# Patient Record
Sex: FEMALE | Race: WHITE | NOT HISPANIC OR LATINO | Employment: FULL TIME | URBAN - METROPOLITAN AREA
[De-identification: names, ages, dates, MRNs, and addresses within clinical notes are randomized per-mention and may not be internally consistent; named-entity substitution may affect disease eponyms.]

---

## 2023-06-06 ENCOUNTER — OFFICE VISIT (OUTPATIENT)
Dept: PRIMARY CARE | Facility: CLINIC | Age: 58
End: 2023-06-06
Payer: COMMERCIAL

## 2023-06-06 VITALS
HEART RATE: 71 BPM | SYSTOLIC BLOOD PRESSURE: 107 MMHG | DIASTOLIC BLOOD PRESSURE: 68 MMHG | HEIGHT: 61 IN | BODY MASS INDEX: 25.3 KG/M2 | OXYGEN SATURATION: 96 % | WEIGHT: 134 LBS | TEMPERATURE: 97.3 F

## 2023-06-06 DIAGNOSIS — K52.9 CHRONIC DIARRHEA: ICD-10-CM

## 2023-06-06 DIAGNOSIS — R53.82 CHRONIC FATIGUE: ICD-10-CM

## 2023-06-06 DIAGNOSIS — E78.5 HYPERLIPIDEMIA, UNSPECIFIED HYPERLIPIDEMIA TYPE: ICD-10-CM

## 2023-06-06 DIAGNOSIS — E06.3 HASHIMOTO'S DISEASE: Primary | ICD-10-CM

## 2023-06-06 PROBLEM — S32.9XXA PELVIC FRACTURE (MULTI): Status: ACTIVE | Noted: 2023-06-06

## 2023-06-06 PROBLEM — Z85.43 HX OF OVARIAN CANCER: Status: ACTIVE | Noted: 2023-06-06

## 2023-06-06 PROBLEM — C50.919 ADENOCARCINOMA OF BREAST (MULTI): Status: ACTIVE | Noted: 2023-06-06

## 2023-06-06 PROBLEM — J45.909 ASTHMA (HHS-HCC): Status: ACTIVE | Noted: 2023-06-06

## 2023-06-06 PROBLEM — Z85.3 HISTORY OF LEFT BREAST CANCER: Status: ACTIVE | Noted: 2023-06-06

## 2023-06-06 PROBLEM — G93.32 CHRONIC FATIGUE SYNDROME: Status: ACTIVE | Noted: 2020-04-29

## 2023-06-06 PROBLEM — E03.8 HYPOTHYROIDISM DUE TO HASHIMOTO'S THYROIDITIS: Status: ACTIVE | Noted: 2019-08-07

## 2023-06-06 PROBLEM — I34.1 MVP (MITRAL VALVE PROLAPSE): Status: ACTIVE | Noted: 2023-06-06

## 2023-06-06 PROBLEM — Z90.13 HISTORY OF BILATERAL MASTECTOMY: Status: ACTIVE | Noted: 2023-06-06

## 2023-06-06 PROBLEM — D68.2: Status: ACTIVE | Noted: 2023-06-06

## 2023-06-06 PROBLEM — M85.80 OSTEOPENIA: Status: ACTIVE | Noted: 2023-06-06

## 2023-06-06 LAB
ALANINE AMINOTRANSFERASE (SGPT) (U/L) IN SER/PLAS: 8 U/L (ref 7–45)
ALBUMIN (G/DL) IN SER/PLAS: 4.3 G/DL (ref 3.4–5)
ALKALINE PHOSPHATASE (U/L) IN SER/PLAS: 63 U/L (ref 33–110)
ANION GAP IN SER/PLAS: 11 MMOL/L (ref 10–20)
ASPARTATE AMINOTRANSFERASE (SGOT) (U/L) IN SER/PLAS: 10 U/L (ref 9–39)
BASOPHILS (10*3/UL) IN BLOOD BY AUTOMATED COUNT: 0.07 X10E9/L (ref 0–0.1)
BASOPHILS/100 LEUKOCYTES IN BLOOD BY AUTOMATED COUNT: 1 % (ref 0–2)
BILIRUBIN TOTAL (MG/DL) IN SER/PLAS: 0.3 MG/DL (ref 0–1.2)
CALCIUM (MG/DL) IN SER/PLAS: 10.1 MG/DL (ref 8.6–10.6)
CARBON DIOXIDE, TOTAL (MMOL/L) IN SER/PLAS: 20 MMOL/L (ref 21–32)
CHLORIDE (MMOL/L) IN SER/PLAS: 109 MMOL/L (ref 98–107)
CHOLESTEROL (MG/DL) IN SER/PLAS: 442 MG/DL (ref 0–199)
CHOLESTEROL IN HDL (MG/DL) IN SER/PLAS: 51.5 MG/DL
CHOLESTEROL/HDL RATIO: 8.6
CREATININE (MG/DL) IN SER/PLAS: 0.93 MG/DL (ref 0.5–1.05)
EOSINOPHILS (10*3/UL) IN BLOOD BY AUTOMATED COUNT: 0.19 X10E9/L (ref 0–0.7)
EOSINOPHILS/100 LEUKOCYTES IN BLOOD BY AUTOMATED COUNT: 2.8 % (ref 0–6)
ERYTHROCYTE DISTRIBUTION WIDTH (RATIO) BY AUTOMATED COUNT: 13.2 % (ref 11.5–14.5)
ERYTHROCYTE MEAN CORPUSCULAR HEMOGLOBIN CONCENTRATION (G/DL) BY AUTOMATED: 33.2 G/DL (ref 32–36)
ERYTHROCYTE MEAN CORPUSCULAR VOLUME (FL) BY AUTOMATED COUNT: 100 FL (ref 80–100)
ERYTHROCYTES (10*6/UL) IN BLOOD BY AUTOMATED COUNT: 3.96 X10E12/L (ref 4–5.2)
GFR FEMALE: 72 ML/MIN/1.73M2
GLUCOSE (MG/DL) IN SER/PLAS: 91 MG/DL (ref 74–99)
HEMATOCRIT (%) IN BLOOD BY AUTOMATED COUNT: 39.5 % (ref 36–46)
HEMOGLOBIN (G/DL) IN BLOOD: 13.1 G/DL (ref 12–16)
IMMATURE GRANULOCYTES/100 LEUKOCYTES IN BLOOD BY AUTOMATED COUNT: 0.3 % (ref 0–0.9)
LDL: 334 MG/DL (ref 0–99)
LEUKOCYTES (10*3/UL) IN BLOOD BY AUTOMATED COUNT: 6.8 X10E9/L (ref 4.4–11.3)
LYMPHOCYTES (10*3/UL) IN BLOOD BY AUTOMATED COUNT: 2.58 X10E9/L (ref 1.2–4.8)
LYMPHOCYTES/100 LEUKOCYTES IN BLOOD BY AUTOMATED COUNT: 37.9 % (ref 13–44)
MONOCYTES (10*3/UL) IN BLOOD BY AUTOMATED COUNT: 0.35 X10E9/L (ref 0.1–1)
MONOCYTES/100 LEUKOCYTES IN BLOOD BY AUTOMATED COUNT: 5.1 % (ref 2–10)
NEUTROPHILS (10*3/UL) IN BLOOD BY AUTOMATED COUNT: 3.59 X10E9/L (ref 1.2–7.7)
NEUTROPHILS/100 LEUKOCYTES IN BLOOD BY AUTOMATED COUNT: 52.9 % (ref 40–80)
NON HDL CHOLESTEROL: 391 MG/DL
NRBC (PER 100 WBCS) BY AUTOMATED COUNT: 0 /100 WBC (ref 0–0)
PLATELETS (10*3/UL) IN BLOOD AUTOMATED COUNT: 300 X10E9/L (ref 150–450)
POTASSIUM (MMOL/L) IN SER/PLAS: 4.4 MMOL/L (ref 3.5–5.3)
PROTEIN TOTAL: 7.5 G/DL (ref 6.4–8.2)
SODIUM (MMOL/L) IN SER/PLAS: 136 MMOL/L (ref 136–145)
THYROTROPIN (MIU/L) IN SER/PLAS BY DETECTION LIMIT <= 0.05 MIU/L: 0.41 MIU/L (ref 0.44–3.98)
THYROXINE (T4) FREE (NG/DL) IN SER/PLAS: 1.18 NG/DL (ref 0.78–1.48)
TRIGLYCERIDE (MG/DL) IN SER/PLAS: 284 MG/DL (ref 0–149)
UREA NITROGEN (MG/DL) IN SER/PLAS: 17 MG/DL (ref 6–23)
VLDL: 57 MG/DL (ref 0–40)

## 2023-06-06 PROCEDURE — 84443 ASSAY THYROID STIM HORMONE: CPT

## 2023-06-06 PROCEDURE — 80061 LIPID PANEL: CPT

## 2023-06-06 PROCEDURE — 99205 OFFICE O/P NEW HI 60 MIN: CPT | Performed by: INTERNAL MEDICINE

## 2023-06-06 PROCEDURE — 4004F PT TOBACCO SCREEN RCVD TLK: CPT | Performed by: INTERNAL MEDICINE

## 2023-06-06 PROCEDURE — 80053 COMPREHEN METABOLIC PANEL: CPT

## 2023-06-06 PROCEDURE — 85025 COMPLETE CBC W/AUTO DIFF WBC: CPT

## 2023-06-06 PROCEDURE — 84439 ASSAY OF FREE THYROXINE: CPT

## 2023-06-06 RX ORDER — LEVOTHYROXINE SODIUM 100 UG/1
100 TABLET ORAL
COMMUNITY

## 2023-06-06 RX ORDER — ONDANSETRON 8 MG/1
8 TABLET, ORALLY DISINTEGRATING ORAL EVERY 8 HOURS PRN
COMMUNITY

## 2023-06-06 RX ORDER — BIMATOPROST 3 UG/ML
1 SOLUTION TOPICAL NIGHTLY
COMMUNITY
Start: 2022-08-01

## 2023-06-06 RX ORDER — ALBUTEROL SULFATE 90 UG/1
2 AEROSOL, METERED RESPIRATORY (INHALATION) EVERY 4 HOURS PRN
COMMUNITY
Start: 2023-05-27

## 2023-06-06 RX ORDER — TRAZODONE HYDROCHLORIDE 50 MG/1
50 TABLET ORAL NIGHTLY
COMMUNITY
Start: 2023-05-14 | End: 2023-06-06 | Stop reason: ALTCHOICE

## 2023-06-06 RX ORDER — FLUTICASONE PROPIONATE 50 MCG
2 SPRAY, SUSPENSION (ML) NASAL DAILY
COMMUNITY
Start: 2023-04-30 | End: 2024-05-12 | Stop reason: ALTCHOICE

## 2023-06-06 ASSESSMENT — ENCOUNTER SYMPTOMS
DIARRHEA: 1
FEVER: 0
CONSTIPATION: 0
NAUSEA: 0
SHORTNESS OF BREATH: 0
ABDOMINAL PAIN: 1
UNEXPECTED WEIGHT CHANGE: 0
HEMATURIA: 0
FATIGUE: 1
BLOOD IN STOOL: 0

## 2023-06-06 NOTE — PROGRESS NOTES
Subjective   Patient ID: Maricruz Cantu is a 57 y.o. female who presents for New Patient Visit.    HPI   Acute concerns:  Chronic Fatigue - sleeps often 8-9 hours on average but still very tired during the day. States she does not snore. Started Taking trazodone 50 mg daily in 2022 after she had issues sleeping after her dog . She now feels it knocks her out and hopes to stopit.  - no mood changes, no myalgias. Does not fall asleep during the day but feels like she wants to take nap    Gastrointestinal issues: Has been occurring for many but noted worsening in last year.  - Has loose stools to watery stools- yesterday had 15-18 BM. On average goes about 4-5 times per day. Had colonoscopy  for these symptoms but biopsy negative for H pylori, celiac neg. No colitis. 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 Never treated for CMV  but saw ID and CMV DNA neg .  *History of positive C diff. In  and is unsure if she completed abx treatment.  - Cramping abdominal pain, sweating  - Non-bloody stools.    Chronic issues:  Hx of Left Breast Cancer: BRCA-, HER2 neg.  - Had bilateral mastectomy surgery in  then breast reconstruction with implant prosthesis and now s/p implant removal. No chemotherapy. Started tamoxifen briefly. Recently saw Breast surgery oncologist Dr. Deluca.  Currently in remission.  Also had radical hysterectomy with lymphadenectomy  Also reports strong family hx of breast and ovarian cancer.    Ovarian cancer  - s/p radical hysterectomy in . Stage 1 ovarian cancer and no chemotherapy. Currently in remission.    Hx of Mitral Valve Prolase:  - Diagnosed at 20 y.o. Still takes antibiotics before surgery and dental procedures.    Hashimotos's hypothyroidism  - levothyroxine 100 mcg    Factor XI deficiency, bleeding disorder.   - not hx of blood clots. Diagnosed by hematologist. No treatment. But before surgery tells hx of bleeding disorder. Never needed transfusion.    Osteopenia: On  Bone density scan in . Reports a hx of pelvic fracture without any trauma or fall.  Not taking vitamin D or calcium currently    Takes fluticasone for allergies. Albuterol inhaler rarely as needed.    Hx of taking statin for hyperlipidemia. Not currently on anything.    In  Had axillary lymphadenopathy - US showed likely benign lymph nodes, likely reactive from infection.    Past Med Hx: breast cancer, ovarian cancer, Hashimoto's, recurrent CMV infections. Hyperlipidemia   Pelvic fracture    Past Surg hx: breast mastectomy, breast reconstruction with implant prosthesis, breast implant removal, radical hysterectomy, septoplasty. appendectomy    Family Hx:   Brother - Crohns disease  Mother - ischemic colitis, hyperlipidemia  Paternal grandfather - diabetes  Colon cancer - paternal grandmother  at 52.  Father - prostate cancer,hyperlipidemia  Breast cancer - mother, grandmother, sister, aunt. But tested BRCA negative.  Paternal Aunt - recently diagnosed with melanoma.    Medications: levothyroxine 100 mcg, flonase, trazodone 50 mg, zofran PRN, latisse eye drops  Allergies: multiple drug allergies - see chart    Activity: Walks dog.   Diet: Eats relatively healthy. No red meat. No fast food.  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  Smoking: Smoked for 10 years and smoked 1 packs. Quit in 2019. Sparingly uses tobacco cigarettes. No marijuana use.  Alcohol: on special occasions, rarely.  Sexual hx: monogamous relationship. Has little sex drive since hysterectomy.    Review of Systems   Constitutional:  Positive for fatigue. Negative for fever and unexpected weight change.   Respiratory:  Negative for shortness of breath.    Gastrointestinal:  Positive for abdominal pain and diarrhea. Negative for blood in stool, constipation and nausea.   Genitourinary:  Negative for hematuria.       Objective   There were no vitals taken for this visit.    Physical  Exam  Constitutional:       Appearance: Normal appearance.   HENT:      Head: Normocephalic and atraumatic.      Right Ear: Tympanic membrane and ear canal normal.      Left Ear: Tympanic membrane and ear canal normal.      Nose: Nose normal.      Mouth/Throat:      Mouth: Mucous membranes are moist.      Pharynx: Oropharynx is clear.   Eyes:      Extraocular Movements: Extraocular movements intact.      Conjunctiva/sclera: Conjunctivae normal.      Pupils: Pupils are equal, round, and reactive to light.   Cardiovascular:      Rate and Rhythm: Normal rate and regular rhythm.      Pulses: Normal pulses.      Heart sounds: Normal heart sounds.   Pulmonary:      Effort: Pulmonary effort is normal.      Breath sounds: Normal breath sounds.   Abdominal:      General: Abdomen is flat. Bowel sounds are normal. There is no distension.      Palpations: Abdomen is soft.      Tenderness: There is no abdominal tenderness.   Musculoskeletal:         General: Normal range of motion.      Cervical back: Normal range of motion and neck supple.   Skin:     General: Skin is warm and dry.   Neurological:      General: No focal deficit present.      Mental Status: She is alert and oriented to person, place, and time.   Psychiatric:         Mood and Affect: Mood normal.         Behavior: Behavior normal.         Assessment/Plan   Diagnoses and all orders for this visit:  Hashimoto's disease  -     Tsh With Reflex To Free T4 If Abnormal  Hyperlipidemia, unspecified hyperlipidemia type  -     Lipid panel  -     Comprehensive metabolic panel  Chronic fatigue  -     CBC and Auto Differential  Chronic diarrhea  -     Lactoferrin, Fecal, Quantitative; Future  -     Fecal Fat, Qualitative; Future  -     C. difficile, PCR; Future    Maricruz Pepe is a pleasant 57 y.o. female presenting to establish care. She has PMHx of breast and ovarian cancer now in remission, hyperlipidemia, hashimoto hypothyroidism, MVP, Factor XI deficiency, osteopenia and  chronic diarrhea.    Chronic diarrhea, abdominal pain  C/f irritable bowel syndrome with diarrhea vs. IBD vs. C diff. EGD Colonoscopy eval in 2018 largely negative for inflammatory process, neg EGD. Neg for Celiac.  Hx of Cdiff in 2021, unsure if adequately treated  [  ] Fecal fat and  lactoferrin stool study  [  ] C. diff PCR     Chronic Fatigue  - Discontinue trazodone due to unwanted side effects of drowsiness. Will wean to 1/2 pill in 2 weeks, then 1/4 for 2 weeks and then stop  - Sleep hygiene recommendations  - Evaluate thyroid function  - Follow up stool studies    Oncology:  Hx of breast cancer and ovarian now s/p mastectomy and radical hysterectomy. Never required chemo. In remission.  [  ] Overdue for follow up with breast oncologist who usually orders screening imaging. Recommended follow up - will reach out to breast cancer oncology team about establishing with new oncologist.    Osteopenia.   - Recommend vitamin D and calcium  - Recommend repeat DEXA scan a next visit.    Hyperlipidemia  Hx of elevated LDL, total cholesterol. Not checked in a few years. Tried statin in past and stopped it. Will likely need to start medication.  [  ] lipid panel    Hashimoto hypothyroidism:  - Continue levothyroxine 100 mcg daily  [  ] TSH reflex T4    Health maintenance  Metabolic screening: Check lipids and CMP today  -Last colonoscopy:  Last colonoscopy at [facility]  in 2018 - Told to follow up in 10 years.   -Also Had EGD - negative, neg. H pylori  - Mammogram: Unable to get mammogram so often has MRI or US.   Recommending yearly exams with oncology - overdue.   -Pap Smears: not indicated since hysterectomy/removal of cervix  - Recommended seeing dermatology for evaluation, given hx of melanoma in Aunt.  -Smoking history: Very rarely, past 10 pack year hx  -Counseled regarding diet and exercise  - DEXA scan: Bone density scan in 2014. Hx of pelvic fracture without any trauma or fall. Recommend repeat  scan.  -Immunizations: Tdap 2017, UTD on COVID. States she refuses annual Flu vaccinations and shingles vaccine.  -Follow-up as needed and for AWV.    Patient seen and staffed with Dr. Ayo Santizo MD  PGY1  Med Peds

## 2023-06-06 NOTE — PROGRESS NOTES
I saw and evaluated the patient. I personally obtained the key and critical portions of the history and physical exam or was physically present for key and critical portions performed by the resident/fellow. I reviewed the resident/fellow's documentation and discussed the patient with the resident/fellow. I agree with the resident/fellow's medical decision making as documented in the note.    Shine Rollins MD

## 2023-06-08 DIAGNOSIS — E78.5 HYPERLIPIDEMIA, UNSPECIFIED HYPERLIPIDEMIA TYPE: Primary | ICD-10-CM

## 2023-06-08 DIAGNOSIS — M85.80 OSTEOPENIA, UNSPECIFIED LOCATION: ICD-10-CM

## 2023-06-08 DIAGNOSIS — E78.2 HYPERLIPIDEMIA, MIXED: ICD-10-CM

## 2023-06-08 RX ORDER — ROSUVASTATIN CALCIUM 5 MG/1
5 TABLET, COATED ORAL DAILY
Qty: 30 TABLET | Refills: 5 | Status: SHIPPED | OUTPATIENT
Start: 2023-06-08 | End: 2023-12-05

## 2023-07-17 ENCOUNTER — PATIENT MESSAGE (OUTPATIENT)
Dept: PRIMARY CARE | Facility: CLINIC | Age: 58
End: 2023-07-17
Payer: COMMERCIAL

## 2023-07-17 DIAGNOSIS — M79.646 THUMB PAIN, UNSPECIFIED LATERALITY: Primary | ICD-10-CM

## 2023-10-27 ENCOUNTER — APPOINTMENT (OUTPATIENT)
Dept: PRIMARY CARE | Facility: CLINIC | Age: 58
End: 2023-10-27
Payer: COMMERCIAL

## 2023-11-28 ENCOUNTER — APPOINTMENT (OUTPATIENT)
Dept: PRIMARY CARE | Facility: CLINIC | Age: 58
End: 2023-11-28
Payer: COMMERCIAL

## 2023-12-19 ENCOUNTER — APPOINTMENT (OUTPATIENT)
Dept: PRIMARY CARE | Facility: CLINIC | Age: 58
End: 2023-12-19
Payer: COMMERCIAL

## 2024-01-08 ENCOUNTER — APPOINTMENT (OUTPATIENT)
Dept: PRIMARY CARE | Facility: CLINIC | Age: 59
End: 2024-01-08
Payer: COMMERCIAL

## 2024-05-12 ENCOUNTER — E-VISIT (OUTPATIENT)
Dept: PRIMARY CARE | Facility: CLINIC | Age: 59
End: 2024-05-12
Payer: COMMERCIAL

## 2024-05-12 ENCOUNTER — APPOINTMENT (OUTPATIENT)
Dept: PRIMARY CARE | Facility: CLINIC | Age: 59
End: 2024-05-12
Payer: COMMERCIAL

## 2024-05-12 DIAGNOSIS — J01.00 ACUTE NON-RECURRENT MAXILLARY SINUSITIS: Primary | ICD-10-CM

## 2024-05-12 PROCEDURE — 99421 OL DIG E/M SVC 5-10 MIN: CPT | Performed by: NURSE PRACTITIONER

## 2024-05-12 RX ORDER — AMOXICILLIN AND CLAVULANATE POTASSIUM 875; 125 MG/1; MG/1
1 TABLET, FILM COATED ORAL 2 TIMES DAILY
Qty: 20 TABLET | Refills: 0 | Status: SHIPPED | OUTPATIENT
Start: 2024-05-12 | End: 2024-05-22

## 2024-05-12 RX ORDER — FLUTICASONE PROPIONATE 50 MCG
1 SPRAY, SUSPENSION (ML) NASAL DAILY
Qty: 16 G | Refills: 0 | Status: SHIPPED | OUTPATIENT
Start: 2024-05-12

## 2024-05-12 ASSESSMENT — LIFESTYLE VARIABLES: HISTORY_OF_SMOKING: I SMOKED IN THE PAST, BUT QUIT

## 2024-05-12 NOTE — TELEPHONE ENCOUNTER
57 yo female with E-visit for c/o sinus pain/pressure.   Sx onset: 2-4 weeks  No relief with nasal saline and tylenol  No fevers  Allergies reviewed  Pt treated with Augmentin x 10 days  Encouraged to use nasal saline lavage

## 2024-06-28 ENCOUNTER — OFFICE VISIT (OUTPATIENT)
Dept: ORTHOPEDIC SURGERY | Facility: HOSPITAL | Age: 59
End: 2024-06-28
Payer: COMMERCIAL

## 2024-06-28 DIAGNOSIS — M65.311 TRIGGER FINGER OF RIGHT THUMB: Primary | ICD-10-CM

## 2024-06-28 DIAGNOSIS — M65.311 TRIGGER THUMB OF RIGHT HAND: ICD-10-CM

## 2024-06-28 PROCEDURE — 99214 OFFICE O/P EST MOD 30 MIN: CPT | Performed by: ORTHOPAEDIC SURGERY

## 2024-06-28 NOTE — H&P (VIEW-ONLY)
CHIEF COMPLAINT         Right thumb pain    ASSESSMENT + PLAN    Right trigger thumb    The nature of trigger digit was reviewed, along with the natural history.  I reviewed the options for management, including observation, nonsteroidals, splinting, oral steroids, cortisone injection, or surgical release, along with the likely success rates of each.  I reviewed the major risks of surgery, along with the benefits, and the postop rehabilitation course.    The patient is interested in proceeding with surgery and will contact the office to schedule a date.  Surgery will be done under sedation and local, at the location of her convenience.  A Stack splint was provided to provide some comfort in the meantime.        HISTORY OF PRESENT ILLNESS       Patient is a 58 y.o. left-hand dominant female  operations, who presents today for evaluation of pain, popping, and clicking of the right thumb over the last week or 2.  No particular trauma around time of onset.  This reminds her of her previous left trigger thumb that improved, but did not completely resolve with the previous splinting.  She wants to go ahead with trigger thumb surgical release on the right.  No numbness or tingling.  No other bothersome digits today.    She is not diabetic.  She is hypothyroid on levothyroxine.  She does not smoke.      REVIEW OF SYSTEMS       An updated 30-item multi-system Review Of Systems was obtained on today's intake form.  This was reviewed with the patient and is correct.  It has been scanned separately into the medical record.      PHYSICAL EXAM    Constitutional:    Appears stated age. Well-developed and well-nourished female in no acute distress.  Psychiatric:         Pleasant normal mood and affect. Behavior is appropriate for the situation.   Head:                   Normocephalic and atraumatic.  Eyes:                    Pupils are equal and round.  Cardiovascular:  2+ radial and ulnar pulses. Fingers  well-perfused.  Respiratory:        Effort normal. No respiratory distress. Speaking in complete sentences.  Neurologic:       Alert and oriented to person, place, and time.  Skin:                Skin is intact, warm and dry.  Hematologic / Lymphatic:    No lymphedema or lymphangitis.    Extremities / Musculoskeletal:                      Skin of the right thumb and hand is intact with no erythema, ecchymosis, or diffuse swelling.  Normal skin drag and coloration.  Full composite finger flexion extension with full intrinsic plus minus posture.  Obvious spontaneous triggering of the thumb.  Tender at the dorsal IP and the volar MP.  No de Quervain's or CMC signs.  Symmetric wrist and forearm motion.  Negative Paul.  Negative midcarpal shift.  Sensation intact to light touch in all distributions.  Capillary refill less than 2 seconds.      IMAGING / LABS / EMGs           X-[facility] were taken at [facility] yesterday.  The report was available in Epic, and shows arthritic change at the MP and IP joints.  The images were not available for my review.      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    Factor XI deficiency    Personal history of malignant neoplasm of breast 05/27/2021    History of malignant neoplasm of breast    Personal history of other diseases of the nervous system and sense organs     History of optic neuritis    Personal history of other diseases of the respiratory system 10/08/2013    History of bronchitis    Personal history of other diseases of the respiratory system 05/27/2021    Personal history of sinusitis    Personal history of other infectious and parasitic diseases 04/18/2014    History of herpes zoster       Medication Documentation Review Audit       Reviewed by ZEESHAN Prado (Nurse Practitioner) on 05/12/24 at 1628      Medication Order Taking? Sig Documenting Provider Last Dose Status   albuterol 90 mcg/actuation inhaler [id number]   Inhale 2 puffs every 4 hours if needed for wheezing or shortness of breath. Historical Provider, MD  Active   amoxicillin-pot clavulanate (Augmentin) 875-125 mg tablet [id number]  Take 1 tablet by mouth 2 times a day for 10 days. AMRIK Prado-CNP  Active   bimatoprost (Latisse) 0.03 % ophthalmic solution [id number]  Administer 1 Application into both eyes once daily at bedtime. Historical Provider, MD  Active   fluticasone (Flonase) 50 mcg/actuation nasal spray [id number]  Administer 2 sprays into each nostril once daily. Historical Provider, MD  Active   levothyroxine (Synthroid, Levoxyl) 100 mcg tablet [id number]  Take 1 tablet (100 mcg) by mouth once daily in the morning. Take before meals. Historical Provider, MD  Active   ondansetron ODT (Zofran-ODT) 8 mg disintegrating tablet [id number]  Take 1 tablet (8 mg) by mouth every 8 hours if needed for nausea or vomiting. Historical Provider, MD  Active   rosuvastatin (Crestor) 5 mg tablet [id number]  Take 1 tablet (5 mg) by mouth once daily. Tatyana Santizo MD   23 [id number]                    Allergies   Allergen Reactions    Nitrofurantoin Monohyd/M-Cryst Other    Oxycodone-Acetaminophen Other    Prochlorperazine Other    Sulfa (Sulfonamide Antibiotics) Other    Sulfamethoxazole Other       Social History     Socioeconomic History    Marital status:      Spouse name: Not on file    Number of children: Not on file    Years of education: Not on file    Highest education level: Not on file   Occupational History    Not on file   Tobacco Use    Smoking status: Some Days     Current packs/day: 1.00     Average packs/day: 1 pack/day for 10.0 years (10.0 ttl pk-yrs)     Types: Cigarettes    Smokeless tobacco: Never    Tobacco comments:     States she quit smoking regularly in 2019. Now only rarely smokes cigarettes. States less than once per week.   Substance and Sexual Activity    Alcohol use: Not Currently    Drug use: Never    Sexual activity: Not  Currently   Other Topics Concern    Not on file   Social History Narrative    Not on file     Social Determinants of Health     Financial Resource Strain: Low Risk  (1/17/2023)    Received from [facility]    Overall Financial Resource Strain (CARDIA)     Difficulty of Paying Living Expenses: Not very hard   Food Insecurity: No Food Insecurity (1/17/2023)    Received from [facility]    Hunger Vital Sign     Worried About Running Out of Food in the Last Year: Never true     Ran Out of Food in the Last Year: Never true   Transportation Needs: No Transportation Needs (1/17/2023)    Received from [facility]    PRAPARE - Transportation     Lack of Transportation (Medical): No     Lack of Transportation (Non-Medical): No   Physical Activity: Insufficiently Active (1/17/2023)    Received from [facility]    Exercise Vital Sign     Days of Exercise per Week: 3 days     Minutes of Exercise per Session: 30 min   Stress: Stress Concern Present (1/17/2023)    Received from [facility]    [employer] [place] [employer] - Occupational Stress Questionnaire     Feeling of Stress : To some extent   Social Connections: Moderately Integrated (1/17/2023)    Received from [facility]    Social Connection and Isolation Panel [NHANES]     Frequency of Communication with Friends and Family: More than three times a week     Frequency of Social Gatherings with Friends and Family: Not on file     Attends Jain Services: More than 4 times per year     Active Member of Clubs or Organizations: No     Attends Club or Organization Meetings: Not on file     Marital Status:    Intimate Partner Violence: Not on file   Housing Stability: High Risk (1/17/2023)    Received from [facility]    Housing Stability Vital Sign     Unable to Pay for Housing in the Last Year: Yes     Number of Places Lived in the Last Year: 1     Unstable Housing in the Last Year: No       Past Surgical History:    Procedure Laterality Date    MASTECTOMY  05/27/2021    Breast Surgery Mastectomy    OTHER SURGICAL HISTORY  05/27/2021    Breast Reconstruction With Implant Prosthesis    OTHER SURGICAL HISTORY  05/27/2021    Radical Hysterectomy With Lymphadenectomy    SEPTOPLASTY  05/27/2021    Septoplasty     SURGICAL INDICATION    I reviewed the options for further management of this condition and the likely success rates of each.  The patient feels that they have maximized the benefits of conservative care, and they do want to go on to surgery.    I reviewed the major risks of surgery including infection; scarring; damage to nerves, tendons, or vessels; stiffness; failure to relieve symptoms, recurrent symptoms, progressive arthritis, and wound healing problems, as well as anesthesia risks.  I answered their questions to their satisfaction.  They were given my contact information and will contact the office when they are ready to schedule an exact surgical date.  Surgery will be posted as follows :    Dx :          Right trigger thumb  ICD-10 :      M65.311  Procedure :     Right trigger thumb release  CPT :        88117  Anesth :    MAC  Location :   [facility]  Duration :    45 minutes  Specials :    None  PAT :       No  Post-Op Visit :    10-15 days      Electronically Signed      MICHELLE Vasquez MD      Orthopaedic Hand Surgery      [phone]

## 2024-06-28 NOTE — LETTER
June 28, 2024     Shine Rollins MD  [address] [facility] [address]  [facility] [address]    Patient: Maricruz Cantu   YOB: 1965   Date of Visit: 6/28/2024       Dear Dr. Shine Rollins MD:    Thank you for referring Maricruz Cantu to me for evaluation. Below are my notes for this consultation.  If you have questions, please do not hesitate to call me. I look forward to following your patient along with you.       Sincerely,     Gerson Vasquez MD      CC: No Recipients  ______________________________________________________________________________________    CHIEF COMPLAINT         Right thumb pain    ASSESSMENT + PLAN    Right trigger thumb    The nature of trigger digit was reviewed, along with the natural history.  I reviewed the options for management, including observation, nonsteroidals, splinting, oral steroids, cortisone injection, or surgical release, along with the likely success rates of each.  I reviewed the major risks of surgery, along with the benefits, and the postop rehabilitation course.    The patient is interested in proceeding with surgery and will contact the office to schedule a date.  Surgery will be done under sedation and local, at the location of her convenience.  A Stack splint was provided to provide some comfort in the meantime.        HISTORY OF PRESENT ILLNESS       Patient is a 58 y.o. left-hand dominant female  operations, who presents today for evaluation of pain, popping, and clicking of the right thumb over the last week or 2.  No particular trauma around time of onset.  This reminds her of her previous left trigger thumb that improved, but did not completely resolve with the previous splinting.  She wants to go ahead with trigger thumb surgical release on the right.  No numbness or tingling.  No other bothersome digits today.    She is not diabetic.  She is hypothyroid on levothyroxine.  She does not smoke.      REVIEW OF SYSTEMS       An updated 30-item  multi-system Review Of Systems was obtained on today's intake form.  This was reviewed with the patient and is correct.  It has been scanned separately into the medical record.      PHYSICAL EXAM    Constitutional:    Appears stated age. Well-developed and well-nourished female in no acute distress.  Psychiatric:         Pleasant normal mood and affect. Behavior is appropriate for the situation.   Head:                   Normocephalic and atraumatic.  Eyes:                    Pupils are equal and round.  Cardiovascular:  2+ radial and ulnar pulses. Fingers well-perfused.  Respiratory:        Effort normal. No respiratory distress. Speaking in complete sentences.  Neurologic:       Alert and oriented to person, place, and time.  Skin:                Skin is intact, warm and dry.  Hematologic / Lymphatic:    No lymphedema or lymphangitis.    Extremities / Musculoskeletal:                      Skin of the right thumb and hand is intact with no erythema, ecchymosis, or diffuse swelling.  Normal skin drag and coloration.  Full composite finger flexion extension with full intrinsic plus minus posture.  Obvious spontaneous triggering of the thumb.  Tender at the dorsal IP and the volar MP.  No de Quervain's or CMC signs.  Symmetric wrist and forearm motion.  Negative Paul.  Negative midcarpal shift.  Sensation intact to light touch in all distributions.  Capillary refill less than 2 seconds.      IMAGING / LABS / EMGs           X-[facility] were taken at [facility] yesterday.  The report was available in Epic, and shows arthritic change at the MP and IP joints.  The images were not available for my review.      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    Factor XI deficiency   • Personal history of malignant neoplasm of breast 05/27/2021    History of malignant neoplasm of breast   • Personal history of other diseases of the nervous system and sense organs     History of optic  neuritis   • Personal history of other diseases of the respiratory system 10/08/2013    History of bronchitis   • Personal history of other diseases of the respiratory system 2021    Personal history of sinusitis   • Personal history of other infectious and parasitic diseases 2014    History of herpes zoster       Medication Documentation Review Audit       Reviewed by ZEESHAN Prado (Nurse Practitioner) on 24 at 1628      Medication Order Taking? Sig Documenting Provider Last Dose Status   albuterol 90 mcg/actuation inhaler [id number]  Inhale 2 puffs every 4 hours if needed for wheezing or shortness of breath. Historical Provider, MD  Active   amoxicillin-pot clavulanate (Augmentin) 875-125 mg tablet [id number]  Take 1 tablet by mouth 2 times a day for 10 days. ZEESHAN Prado  Active   bimatoprost (Latisse) 0.03 % ophthalmic solution [id number]  Administer 1 Application into both eyes once daily at bedtime. Historical Provider, MD  Active   fluticasone (Flonase) 50 mcg/actuation nasal spray [id number]  Administer 2 sprays into each nostril once daily. Historical Provider, MD  Active   levothyroxine (Synthroid, Levoxyl) 100 mcg tablet [id number]  Take 1 tablet (100 mcg) by mouth once daily in the morning. Take before meals. Historical Provider, MD  Active   ondansetron ODT (Zofran-ODT) 8 mg disintegrating tablet [id number]  Take 1 tablet (8 mg) by mouth every 8 hours if needed for nausea or vomiting. Historical Provider, MD  Active   rosuvastatin (Crestor) 5 mg tablet [id number]  Take 1 tablet (5 mg) by mouth once daily. Tatyana Santizo MD   23 [id number]                    Allergies   Allergen Reactions   • Nitrofurantoin Monohyd/M-Cryst Other   • Oxycodone-Acetaminophen Other   • Prochlorperazine Other   • Sulfa (Sulfonamide Antibiotics) Other   • Sulfamethoxazole Other       Social History     Socioeconomic History   • Marital status:      Spouse name: Not on file   •  Number of children: Not on file   • Years of education: Not on file   • Highest education level: Not on file   Occupational History   • Not on file   Tobacco Use   • Smoking status: Some Days     Current packs/day: 1.00     Average packs/day: 1 pack/day for 10.0 years (10.0 ttl pk-yrs)     Types: Cigarettes   • Smokeless tobacco: Never   • Tobacco comments:     States she quit smoking regularly in 2019. Now only rarely smokes cigarettes. States less than once per week.   Substance and Sexual Activity   • Alcohol use: Not Currently   • Drug use: Never   • Sexual activity: Not Currently   Other Topics Concern   • Not on file   Social History Narrative   • Not on file     Social Determinants of Health     Financial Resource Strain: Low Risk  (1/17/2023)    Received from [facility]    Overall Financial Resource Strain (CARDIA)    • Difficulty of Paying Living Expenses: Not very hard   Food Insecurity: No Food Insecurity (1/17/2023)    Received from [facility]    Hunger Vital Sign    • Worried About Running Out of Food in the Last Year: Never true    • Ran Out of Food in the Last Year: Never true   Transportation Needs: No Transportation Needs (1/17/2023)    Received from [facility]    PRAPARE - Transportation    • Lack of Transportation (Medical): No    • Lack of Transportation (Non-Medical): No   Physical Activity: Insufficiently Active (1/17/2023)    Received from [facility]    Exercise Vital Sign    • Days of Exercise per Week: 3 days    • Minutes of Exercise per Session: 30 min   Stress: Stress Concern Present (1/17/2023)    Received from [facility]    [employer] - Occupational Stress Questionnaire    • Feeling of Stress : To some extent   Social Connections: Moderately Integrated (1/17/2023)    Received from [facility]    Social Connection and Isolation Panel [NHANES]    • Frequency of Communication with Friends and Family: More than three times a  week    • Frequency of Social Gatherings with Friends and Family: Not on file    • Attends Scientologist Services: More than 4 times per year    • Active Member of Clubs or Organizations: No    • Attends Club or Organization Meetings: Not on file    • Marital Status:    Intimate Partner Violence: Not on file   Housing Stability: High Risk (1/17/2023)    Received from [facility]    Housing Stability Vital Sign    • Unable to Pay for Housing in the Last Year: Yes    • Number of Places Lived in the Last Year: 1    • Unstable Housing in the Last Year: No       Past Surgical History:   Procedure Laterality Date   • MASTECTOMY  05/27/2021    Breast Surgery Mastectomy   • OTHER SURGICAL HISTORY  05/27/2021    Breast Reconstruction With Implant Prosthesis   • OTHER SURGICAL HISTORY  05/27/2021    Radical Hysterectomy With Lymphadenectomy   • SEPTOPLASTY  05/27/2021    Septoplasty     SURGICAL INDICATION    I reviewed the options for further management of this condition and the likely success rates of each.  The patient feels that they have maximized the benefits of conservative care, and they do want to go on to surgery.    I reviewed the major risks of surgery including infection; scarring; damage to nerves, tendons, or vessels; stiffness; failure to relieve symptoms, recurrent symptoms, progressive arthritis, and wound healing problems, as well as anesthesia risks.  I answered their questions to their satisfaction.  They were given my contact information and will contact the office when they are ready to schedule an exact surgical date.  Surgery will be posted as follows :    Dx :          Right trigger thumb  ICD-10 :      M65.311  Procedure :     Right trigger thumb release  CPT :        56427  Anesth :    MAC  [facility]  Duration :    45 minutes  Specials :    None  PAT :       No  Post-Op Visit :    10-15 days      Electronically Signed      MICHELLE Vasquez MD      Orthopaedic Hand  Surgery      [phone]

## 2024-06-28 NOTE — PROGRESS NOTES
CHIEF COMPLAINT         Right thumb pain    ASSESSMENT + PLAN    Right trigger thumb    The nature of trigger digit was reviewed, along with the natural history.  I reviewed the options for management, including observation, nonsteroidals, splinting, oral steroids, cortisone injection, or surgical release, along with the likely success rates of each.  I reviewed the major risks of surgery, along with the benefits, and the postop rehabilitation course.    The patient is interested in proceeding with surgery and will contact the office to schedule a date.  Surgery will be done under sedation and local, at the location of her convenience.  A Stack splint was provided to provide some comfort in the meantime.        HISTORY OF PRESENT ILLNESS       Patient is a 58 y.o. left-hand dominant female  operations, who presents today for evaluation of pain, popping, and clicking of the right thumb over the last week or 2.  No particular trauma around time of onset.  This reminds her of her previous left trigger thumb that improved, but did not completely resolve with the previous splinting.  She wants to go ahead with trigger thumb surgical release on the right.  No numbness or tingling.  No other bothersome digits today.    She is not diabetic.  She is hypothyroid on levothyroxine.  She does not smoke.      REVIEW OF SYSTEMS       An updated 30-item multi-system Review Of Systems was obtained on today's intake form.  This was reviewed with the patient and is correct.  It has been scanned separately into the medical record.      PHYSICAL EXAM    Constitutional:    Appears stated age. Well-developed and well-nourished female in no acute distress.  Psychiatric:         Pleasant normal mood and affect. Behavior is appropriate for the situation.   Head:                   Normocephalic and atraumatic.  Eyes:                    Pupils are equal and round.  Cardiovascular:  2+ radial and ulnar pulses. Fingers  well-perfused.  Respiratory:        Effort normal. No respiratory distress. Speaking in complete sentences.  Neurologic:       Alert and oriented to person, place, and time.  Skin:                Skin is intact, warm and dry.  Hematologic / Lymphatic:    No lymphedema or lymphangitis.    Extremities / Musculoskeletal:                      Skin of the right thumb and hand is intact with no erythema, ecchymosis, or diffuse swelling.  Normal skin drag and coloration.  Full composite finger flexion extension with full intrinsic plus minus posture.  Obvious spontaneous triggering of the thumb.  Tender at the dorsal IP and the volar MP.  No de Quervain's or CMC signs.  Symmetric wrist and forearm motion.  Negative Paul.  Negative midcarpal shift.  Sensation intact to light touch in all distributions.  Capillary refill less than 2 seconds.      IMAGING / LABS / EMGs           X-rays were taken at [facility] yesterday.  The report was available in Epic, and shows arthritic change at the MP and IP joints.  The images were not available for my review.      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    Factor XI deficiency    Personal history of malignant neoplasm of breast 05/27/2021    History of malignant neoplasm of breast    Personal history of other diseases of the nervous system and sense organs     History of optic neuritis    Personal history of other diseases of the respiratory system 10/08/2013    History of bronchitis    Personal history of other diseases of the respiratory system 05/27/2021    Personal history of sinusitis    Personal history of other infectious and parasitic diseases 04/18/2014    History of herpes zoster       Medication Documentation Review Audit       Reviewed by ZEESHAN Prado (Nurse Practitioner) on 05/12/24 at 1628      Medication Order Taking? Sig Documenting Provider Last Dose Status   albuterol 90 mcg/actuation inhaler [id number]   Inhale 2 puffs every 4 hours if needed for wheezing or shortness of breath. Historical Provider, MD  Active   amoxicillin-pot clavulanate (Augmentin) 875-125 mg tablet [id number]  Take 1 tablet by mouth 2 times a day for 10 days. AMRIK Prado-CNP  Active   bimatoprost (Latisse) 0.03 % ophthalmic solution [id number]  Administer 1 Application into both eyes once daily at bedtime. Historical Provider, MD  Active   fluticasone (Flonase) 50 mcg/actuation nasal spray [id number]  Administer 2 sprays into each nostril once daily. Historical Provider, MD  Active   levothyroxine (Synthroid, Levoxyl) 100 mcg tablet [id number]  Take 1 tablet (100 mcg) by mouth once daily in the morning. Take before meals. Historical Provider, MD  Active   ondansetron ODT (Zofran-ODT) 8 mg disintegrating tablet [id number]  Take 1 tablet (8 mg) by mouth every 8 hours if needed for nausea or vomiting. Historical Provider, MD  Active   rosuvastatin (Crestor) 5 mg tablet [id number]  Take 1 tablet (5 mg) by mouth once daily. Tatyana Santizo MD   23 2359                    Allergies   Allergen Reactions    Nitrofurantoin Monohyd/M-Cryst Other    Oxycodone-Acetaminophen Other    Prochlorperazine Other    Sulfa (Sulfonamide Antibiotics) Other    Sulfamethoxazole Other       Social History     Socioeconomic History    Marital status:      Spouse name: Not on file    Number of children: Not on file    Years of education: Not on file    Highest education level: Not on file   Occupational History    Not on file   Tobacco Use    Smoking status: Some Days     Current packs/day: 1.00     Average packs/day: 1 pack/day for 10.0 years (10.0 ttl pk-yrs)     Types: Cigarettes    Smokeless tobacco: Never    Tobacco comments:     States she quit smoking regularly in 2019. Now only rarely smokes cigarettes. States less than once per week.   Substance and Sexual Activity    Alcohol use: Not Currently    Drug use: Never    Sexual activity: Not  Currently   Other Topics Concern    Not on file   Social History Narrative    Not on file     Social Determinants of Health     Financial Resource Strain: Low Risk  (1/17/2023)    Received from [facility]    Overall Financial Resource Strain (CARDIA)     Difficulty of Paying Living Expenses: Not very hard   Food Insecurity: No Food Insecurity (1/17/2023)    Received from [facility]    Hunger Vital Sign     Worried About Running Out of Food in the Last Year: Never true     Ran Out of Food in the Last Year: Never true   Transportation Needs: No Transportation Needs (1/17/2023)    Received from [facility]    PRAPARE - Transportation     Lack of Transportation (Medical): No     Lack of Transportation (Non-Medical): No   Physical Activity: Insufficiently Active (1/17/2023)    Received from [facility]    Exercise Vital Sign     Days of Exercise per Week: 3 days     Minutes of Exercise per Session: 30 min   Stress: Stress Concern Present (1/17/2023)    Received from [facility]    [employer] [place] - Occupational Stress Questionnaire     Feeling of Stress : To some extent   Social Connections: Moderately Integrated (1/17/2023)    Received from [facility]    Social Connection and Isolation Panel [NHANES]     Frequency of Communication with Friends and Family: More than three times a week     Frequency of Social Gatherings with Friends and Family: Not on file     Attends Hindu Services: More than 4 times per year     Active Member of Clubs or Organizations: No     Attends Club or Organization Meetings: Not on file     Marital Status:    Intimate Partner Violence: Not on file   Housing Stability: High Risk (1/17/2023)    Received from [facility]    Housing Stability Vital Sign     Unable to Pay for Housing in the Last Year: Yes     Number of Places Lived in the Last Year: 1     Unstable Housing in the Last Year: No       Past Surgical History:    Procedure Laterality Date    MASTECTOMY  05/27/2021    Breast Surgery Mastectomy    OTHER SURGICAL HISTORY  05/27/2021    Breast Reconstruction With Implant Prosthesis    OTHER SURGICAL HISTORY  05/27/2021    Radical Hysterectomy With Lymphadenectomy    SEPTOPLASTY  05/27/2021    Septoplasty     SURGICAL INDICATION    I reviewed the options for further management of this condition and the likely success rates of each.  The patient feels that they have maximized the benefits of conservative care, and they do want to go on to surgery.    I reviewed the major risks of surgery including infection; scarring; damage to nerves, tendons, or vessels; stiffness; failure to relieve symptoms, recurrent symptoms, progressive arthritis, and wound healing problems, as well as anesthesia risks.  I answered their questions to their satisfaction.  They were given my contact information and will contact the office when they are ready to schedule an exact surgical date.  Surgery will be posted as follows :    Dx :          Right trigger thumb  ICD-10 :      M65.311  Procedure :     Right trigger thumb release  CPT :        88409  Anesth :    MAC  [facility] :   [facility]  Duration :    45 minutes  Specials :    None  PAT :       No  Post-Op Visit :    10-15 days      Electronically Signed      MICHELLE Vasquez MD      Orthopaedic Hand Surgery      [phone]

## 2024-07-02 ENCOUNTER — APPOINTMENT (OUTPATIENT)
Dept: ORTHOPEDIC SURGERY | Facility: HOSPITAL | Age: 59
End: 2024-07-02
Payer: COMMERCIAL

## 2024-07-09 ENCOUNTER — APPOINTMENT (OUTPATIENT)
Dept: ORTHOPEDIC SURGERY | Facility: HOSPITAL | Age: 59
End: 2024-07-09
Payer: COMMERCIAL

## 2024-07-11 ENCOUNTER — ANESTHESIA EVENT (OUTPATIENT)
Dept: OPERATING ROOM | Facility: CLINIC | Age: 59
End: 2024-07-11
Payer: COMMERCIAL

## 2024-07-11 ENCOUNTER — ANESTHESIA (OUTPATIENT)
Dept: OPERATING ROOM | Facility: CLINIC | Age: 59
End: 2024-07-11
Payer: COMMERCIAL

## 2024-07-11 ENCOUNTER — HOSPITAL ENCOUNTER (OUTPATIENT)
Facility: CLINIC | Age: 59
Setting detail: OUTPATIENT SURGERY
Discharge: HOME | End: 2024-07-11
Attending: ORTHOPAEDIC SURGERY | Admitting: ORTHOPAEDIC SURGERY
Payer: COMMERCIAL

## 2024-07-11 VITALS
TEMPERATURE: 98.4 F | HEIGHT: 61 IN | DIASTOLIC BLOOD PRESSURE: 60 MMHG | WEIGHT: 140.43 LBS | SYSTOLIC BLOOD PRESSURE: 111 MMHG | HEART RATE: 68 BPM | RESPIRATION RATE: 18 BRPM | BODY MASS INDEX: 26.51 KG/M2 | OXYGEN SATURATION: 97 %

## 2024-07-11 DIAGNOSIS — M65.311 TRIGGER THUMB OF RIGHT HAND: Primary | ICD-10-CM

## 2024-07-11 PROCEDURE — 2500000004 HC RX 250 GENERAL PHARMACY W/ HCPCS (ALT 636 FOR OP/ED): Performed by: ANESTHESIOLOGIST ASSISTANT

## 2024-07-11 PROCEDURE — 2500000004 HC RX 250 GENERAL PHARMACY W/ HCPCS (ALT 636 FOR OP/ED): Performed by: ORTHOPAEDIC SURGERY

## 2024-07-11 PROCEDURE — 7100000009 HC PHASE TWO TIME - INITIAL BASE CHARGE: Performed by: ORTHOPAEDIC SURGERY

## 2024-07-11 PROCEDURE — 26055 INCISE FINGER TENDON SHEATH: CPT | Performed by: ORTHOPAEDIC SURGERY

## 2024-07-11 PROCEDURE — 3600000008 HC OR TIME - EACH INCREMENTAL 1 MINUTE - PROCEDURE LEVEL THREE: Performed by: ORTHOPAEDIC SURGERY

## 2024-07-11 PROCEDURE — 3600000003 HC OR TIME - INITIAL BASE CHARGE - PROCEDURE LEVEL THREE: Performed by: ORTHOPAEDIC SURGERY

## 2024-07-11 PROCEDURE — 3700000002 HC GENERAL ANESTHESIA TIME - EACH INCREMENTAL 1 MINUTE: Performed by: ORTHOPAEDIC SURGERY

## 2024-07-11 PROCEDURE — A26055 PR INCISE FINGER TENDON SHEATH: Performed by: ANESTHESIOLOGY

## 2024-07-11 PROCEDURE — 2500000005 HC RX 250 GENERAL PHARMACY W/O HCPCS: Performed by: ORTHOPAEDIC SURGERY

## 2024-07-11 PROCEDURE — 3700000001 HC GENERAL ANESTHESIA TIME - INITIAL BASE CHARGE: Performed by: ORTHOPAEDIC SURGERY

## 2024-07-11 PROCEDURE — 7100000010 HC PHASE TWO TIME - EACH INCREMENTAL 1 MINUTE: Performed by: ORTHOPAEDIC SURGERY

## 2024-07-11 PROCEDURE — A26055 PR INCISE FINGER TENDON SHEATH: Performed by: ANESTHESIOLOGIST ASSISTANT

## 2024-07-11 RX ORDER — ONDANSETRON HYDROCHLORIDE 2 MG/ML
INJECTION, SOLUTION INTRAVENOUS AS NEEDED
Status: DISCONTINUED | OUTPATIENT
Start: 2024-07-11 | End: 2024-07-11

## 2024-07-11 RX ORDER — ONDANSETRON HYDROCHLORIDE 2 MG/ML
4 INJECTION, SOLUTION INTRAVENOUS ONCE AS NEEDED
Status: DISCONTINUED | OUTPATIENT
Start: 2024-07-11 | End: 2024-07-11 | Stop reason: HOSPADM

## 2024-07-11 RX ORDER — CEFAZOLIN 1 G/1
INJECTION, POWDER, FOR SOLUTION INTRAVENOUS AS NEEDED
Status: DISCONTINUED | OUTPATIENT
Start: 2024-07-11 | End: 2024-07-11

## 2024-07-11 RX ORDER — SODIUM CHLORIDE, SODIUM LACTATE, POTASSIUM CHLORIDE, CALCIUM CHLORIDE 600; 310; 30; 20 MG/100ML; MG/100ML; MG/100ML; MG/100ML
100 INJECTION, SOLUTION INTRAVENOUS CONTINUOUS
Status: DISCONTINUED | OUTPATIENT
Start: 2024-07-11 | End: 2024-07-11 | Stop reason: HOSPADM

## 2024-07-11 RX ORDER — SODIUM CHLORIDE 0.9 G/100ML
IRRIGANT IRRIGATION AS NEEDED
Status: DISCONTINUED | OUTPATIENT
Start: 2024-07-11 | End: 2024-07-11 | Stop reason: HOSPADM

## 2024-07-11 RX ORDER — HYDROCODONE BITARTRATE AND ACETAMINOPHEN 5; 325 MG/1; MG/1
1 TABLET ORAL EVERY 6 HOURS PRN
Qty: 14 TABLET | Refills: 0 | Status: SHIPPED | OUTPATIENT
Start: 2024-07-11

## 2024-07-11 RX ORDER — PROPOFOL 10 MG/ML
INJECTION, EMULSION INTRAVENOUS CONTINUOUS PRN
Status: DISCONTINUED | OUTPATIENT
Start: 2024-07-11 | End: 2024-07-11

## 2024-07-11 RX ORDER — FENTANYL CITRATE 50 UG/ML
INJECTION, SOLUTION INTRAMUSCULAR; INTRAVENOUS AS NEEDED
Status: DISCONTINUED | OUTPATIENT
Start: 2024-07-11 | End: 2024-07-11

## 2024-07-11 RX ORDER — MIDAZOLAM HYDROCHLORIDE 1 MG/ML
INJECTION, SOLUTION INTRAMUSCULAR; INTRAVENOUS AS NEEDED
Status: DISCONTINUED | OUTPATIENT
Start: 2024-07-11 | End: 2024-07-11

## 2024-07-11 RX ORDER — CEFAZOLIN SODIUM 2 G/100ML
2 INJECTION, SOLUTION INTRAVENOUS ONCE
Status: DISCONTINUED | OUTPATIENT
Start: 2024-07-11 | End: 2024-07-11 | Stop reason: HOSPADM

## 2024-07-11 RX ORDER — LIDOCAINE IN NACL,ISO-OSMOT/PF 30 MG/3 ML
0.1 SYRINGE (ML) INJECTION ONCE
Status: DISCONTINUED | OUTPATIENT
Start: 2024-07-11 | End: 2024-07-11 | Stop reason: HOSPADM

## 2024-07-11 RX ORDER — ALBUTEROL SULFATE 0.83 MG/ML
2.5 SOLUTION RESPIRATORY (INHALATION) ONCE AS NEEDED
Status: DISCONTINUED | OUTPATIENT
Start: 2024-07-11 | End: 2024-07-11 | Stop reason: HOSPADM

## 2024-07-11 SDOH — HEALTH STABILITY: MENTAL HEALTH: CURRENT SMOKER: 0

## 2024-07-11 ASSESSMENT — PAIN - FUNCTIONAL ASSESSMENT
PAIN_FUNCTIONAL_ASSESSMENT: 0-10

## 2024-07-11 ASSESSMENT — COLUMBIA-SUICIDE SEVERITY RATING SCALE - C-SSRS
2. HAVE YOU ACTUALLY HAD ANY THOUGHTS OF KILLING YOURSELF?: NO
6. HAVE YOU EVER DONE ANYTHING, STARTED TO DO ANYTHING, OR PREPARED TO DO ANYTHING TO END YOUR LIFE?: NO
1. IN THE PAST MONTH, HAVE YOU WISHED YOU WERE DEAD OR WISHED YOU COULD GO TO SLEEP AND NOT WAKE UP?: NO

## 2024-07-11 ASSESSMENT — PAIN SCALES - GENERAL
PAINLEVEL_OUTOF10: 1
PAINLEVEL_OUTOF10: 8
PAINLEVEL_OUTOF10: 0 - NO PAIN
PAINLEVEL_OUTOF10: 1

## 2024-07-11 ASSESSMENT — PAIN DESCRIPTION - DESCRIPTORS: DESCRIPTORS: ACHING;SHARP

## 2024-07-11 NOTE — DISCHARGE INSTRUCTIONS
Follow-Up Instructions    You will need to be seen in clinic in 10-15 days for a post-operative evaluation.  This appointment will be in the outpatient office, not at the Surgery Center.    You will need to call Ursula in my office and schedule an appointment, unless there is a previous appointment that appears on your discharge instructions.  Her phone number is [phone].  Please do not delay in calling to make this appointment.      Activity Restrictions    1)  No driving for 24 hours after surgery, due to the anesthetic.    2)  No driving or operating heavy machinery while taking narcotic pain medication.    3)  Weight bearing as tolerated.  Light use of the fingers (writing, typing, texting) is good to do.     Discharge Medications    A prescription for a narcotic pain medication (Norco) has been sent to your pharmacy of record.  I do not expect you will need this, but wanted you to have it available as an option if over-the-counter medications are not adequately controlling your pain.  Most people simply take Tylenol, Motrin, Advil, or other anti-inflammatory for the pain.  If you do end up taking the prescription medication, please try to wean yourself off this as quickly as possible.    You can add the prescription medication to the anti-inflammatories if needed, but should not add it to Tylenol, as there is already Tylenol in the prescription.    Wound care instructions:     1)  Leave operative dressing in place for 7 days.  If you shower, cover the hand with a plastic bag and elevate it so the water cannot run down into the bag.    2)  After 7 days, remove the bandage and leave the incision open to air, or cover with a simple Band-Aid.   At that point, you may let water run freely over incision when showering.  Do not scrub.  Do not soak in pool or tub, or submerge the incision until you are fully 21 days from surgery.    3)  Call if any drainage after 7 days, increased redness/warmth/swelling at  incision site, abnormal pain/tenderness of the extremity, abnormal swelling of the extremity that does not respond to elevation, shortness of breath, or chest pain.    Tylenol given at 1050 am, no more until 450 pm  Celebrex given today at 1050 am, no ibuprofen, advil, motrin, aleve, naproxen or other NSAIDs until  tomorrow (Friday July 12) at 1050 am

## 2024-07-11 NOTE — ANESTHESIA POSTPROCEDURE EVALUATION
Patient: Maricruz Cantu    Procedure Summary       Date: 07/11/24 Room / Location: [facility] OR 03 / [facility]    Anesthesia Start: 1201 Anesthesia Stop: 1233    Procedure: Right Trigger Thumb Release (Right: Thumb) Diagnosis:       Trigger thumb of right hand      (Trigger thumb of right hand [M65.311])    Surgeons: Gerson Vasquez MD Responsible Provider: Kingsley Triana MD    Anesthesia Type: MAC ASA Status: 2            Anesthesia Type: MAC    Vitals Value Taken Time   /60 07/11/24 1300   Temp 36.9 °C (98.4 °F) 07/11/24 1300   Pulse 68 07/11/24 1300   Resp 18 07/11/24 1300   SpO2 97 % 07/11/24 1300       Anesthesia Post Evaluation    Patient location during evaluation: bedside  Patient participation: complete - patient participated  Level of consciousness: awake  Pain management: adequate  Multimodal analgesia pain management approach  Airway patency: patent  Cardiovascular status: acceptable  Respiratory status: acceptable  Hydration status: acceptable  Postoperative Nausea and Vomiting: none  Comments: Did well    No notable events documented.

## 2024-07-11 NOTE — ANESTHESIA PREPROCEDURE EVALUATION
Patient: Maricruz Cantu    Procedure Information       Anesthesia Start Date/Time: 07/11/24 1201    Procedure: Right Trigger Thumb Release (Right: Thumb)    Location: [facility] OR 03 / [facility]    Surgeons: Gerson Vasquez MD            Relevant Problems   Cardiac   (+) Hyperlipidemia   (+) MVP (mitral valve prolapse)      Pulmonary   (+) Asthma (HHS-HCC)      Endocrine   (+) Hypothyroidism due to Hashimoto's thyroiditis      Hematology   (+) Factor XIII deficiency (Multi)      GYN   (+) Adenocarcinoma of breast (Multi)   (+) Malignant neoplasm of ovary (Multi)       Clinical information reviewed: No acute illnesses   Tobacco  Allergies  Meds   Med Hx  Surg Hx  OB Status  Fam Hx  Soc   Hx        NPO Detail:  NPO/Void Status  Date of Last Liquid: 07/11/24  Time of Last Liquid: 0600  Date of Last Solid: 07/10/24  Time of Last Solid: 1815  Last Intake Type: Clear fluids; Light meal  Time of Last Void: 1045         PHYSICAL EXAM    Anesthesia Plan    History of general anesthesia?: yes  History of complications of general anesthesia?: no    ASA 2     MAC   (Some PONV)  The patient is not a current smoker.    intravenous induction   Anesthetic plan and risks discussed with patient and spouse.    Plan discussed with CAA and CRNA.

## 2024-07-11 NOTE — OP NOTE
ORTHOPEDIC OPERATIVE NOTE    Name:     Maricruz Cantu  :     1965  Facility:    [facility]  Date of Surgery:   2024     PREOP DX:           Right trigger thumb    POSTOP DX:       Same    PROCEDURE:       Right trigger thumb release    SURGEON: JR Pedro MD    RESIDENT/FELLOW/ASSISTANT:  None    ANESTHESIA:    MAC Sedation + Local    ESTIMATED BLOOD LOSS :   5 ml    TOTAL FLUIDS:     200 cc LR    SPECIMEN:   None    TOURNIQUET TIME:  3 Minutes, Esmarch    FINDINGS: Synovitis Nodule    COMPLICATIONS:  None    PATIENT RETURNED TO/CONDITION:  PACU in Good      INDICATIONS:        Maricruz Cantu is a 58 y.o.,  right-hand dominant female who presents with popping and catching of the right thumb that has failed to respond to conservative measures.  She is here for elective right thumb trigger release.  I reminded her of surgical risks of infection; scarring; damage to nerves, tendons, or vessels; stiffness; wound healing problems; failure to relieve symptoms; recurrent symptoms; and need for further surgery.  She wished to proceed.     NARRATIVE:      Following identification of the patient and confirmation of correct site of surgery and signed operative consent, she was brought to the operating room and a hand table affixed to the cart.  A light MAC sedation was administered by Anesthesia along with IV antibiotic dose.  The right upper limb was prepped from fingertip to elbow with chlorhexidine, and draped free in the usual sterile fashion.  5 mL of a mix of 0.5% Marcaine and 1% lidocaine plain was instilled to the planned incision area. The limb was exsanguinated with an Esmarch, which was left in place as a tourniquet.    A standard 15 mm transverse incision was made in the palmodigital crease of the right thumb and taken carefully bluntly down.  The neurovascular bundles were mobilized and protected.  The A1 pulley was then sharply divided in line with the underlying tendon fibers. Complete  release was confirmed by drawing a loop of flexor tendon up above skin level.  There was no significant palmar pulley.  The Esmarch was removed, and pink color rapidly returned to all digits.  Meticulous hemostasis was achieved with bipolar.  After final irrigation, skin was closed with 5-0 Chromic skin stitch, and a bulky soft dressing applied.  The patient was awakened and transferred to Recovery in stable condition.            Electronically signed  MICHELLE Vasquez MD  [phone]

## 2024-07-26 ENCOUNTER — OFFICE VISIT (OUTPATIENT)
Dept: ORTHOPEDIC SURGERY | Facility: HOSPITAL | Age: 59
End: 2024-07-26
Payer: COMMERCIAL

## 2024-07-26 DIAGNOSIS — M65.311 TRIGGER THUMB OF RIGHT HAND: Primary | ICD-10-CM

## 2024-07-26 PROCEDURE — 99211 OFF/OP EST MAY X REQ PHY/QHP: CPT | Performed by: ORTHOPAEDIC SURGERY

## 2024-07-26 NOTE — PROGRESS NOTES
CHIEF COMPLAINT         Right thumb postop f/u    ASSESSMENT + PLAN    Postop day 15 from right trigger thumb release    The incision is healing normally.  Sutures are absorbable.  You may get this wet, but should not soak it for one more week.  Advance activity as pain allows.  Work on the stretching exercises that I demonstrated. Contact my office if you would like a formal occupational therapy referral.     Follow up with any concerns.        HISTORY OF PRESENT ILLNESS       Patient returns today, as directed, postop day 15 from right trigger thumb release.  She reports no further popping or clicking.  No numbness or tingling.  The thumb is moving smoothly.  She is pleased with her outcome so far.      PHYSICAL EXAM       She had removed dressing as instructed.  Incision clean, dry, intact with absorbable suture in place.  Full composite finger flexion extension with full intrinsic plus minus posture.  Intact thumb IP flexion extension with no further triggering.  No de Quervain's or CMC signs.  Sensation intact to light touch in all distributions.  Capillary refill less than 2 seconds.  Symmetric wrist and forearm motion.  2+ radial and ulnar pulses.      IMAGING / LABS / EMGs    None today      Electronically Signed      MICHELLE Vasquez MD      Orthopaedic Hand Surgery      [phone]

## 2024-07-26 NOTE — LETTER
July 26, 2024     Shine Rollins MD  [id number] [address]  [place] [place] [address]    Patient: Maricruz Cantu   YOB: 1965   Date of Visit: 7/26/2024       Dear Dr. Shine Rollins MD:    Thank you for referring Maricruz Cantu to me for evaluation. Below are my notes for this consultation.  If you have questions, please do not hesitate to call me. I look forward to following your patient along with you.       Sincerely,     Gerson Vasquez MD      CC: No Recipients  ______________________________________________________________________________________    CHIEF COMPLAINT         Right thumb postop f/u    ASSESSMENT + PLAN    Postop day 15 from right trigger thumb release    The incision is healing normally.  Sutures are absorbable.  You may get this wet, but should not soak it for one more week.  Advance activity as pain allows.  Work on the stretching exercises that I demonstrated. Contact my office if you would like a formal occupational therapy referral.     Follow up with any concerns.        HISTORY OF PRESENT ILLNESS       Patient returns today, as directed, postop day 15 from right trigger thumb release.  She reports no further popping or clicking.  No numbness or tingling.  The thumb is moving smoothly.  She is pleased with her outcome so far.      PHYSICAL EXAM       She had removed dressing as instructed.  Incision clean, dry, intact with absorbable suture in place.  Full composite finger flexion extension with full intrinsic plus minus posture.  Intact thumb IP flexion extension with no further triggering.  No de Quervain's or CMC signs.  Sensation intact to light touch in all distributions.  Capillary refill less than 2 seconds.  Symmetric wrist and forearm motion.  2+ radial and ulnar pulses.      IMAGING / LABS / EMGs    None today      Electronically Signed      MICHELLE Vasquez MD      Orthopaedic Hand Surgery      [phone]

## 2024-10-23 ENCOUNTER — APPOINTMENT (OUTPATIENT)
Dept: GASTROENTEROLOGY | Facility: CLINIC | Age: 59
End: 2024-10-23
Payer: COMMERCIAL

## 2025-01-03 ENCOUNTER — APPOINTMENT (OUTPATIENT)
Dept: GASTROENTEROLOGY | Facility: CLINIC | Age: 60
End: 2025-01-03
Payer: COMMERCIAL

## 2025-03-05 ENCOUNTER — OFFICE VISIT (OUTPATIENT)
Dept: GASTROENTEROLOGY | Facility: CLINIC | Age: 60
End: 2025-03-05
Payer: COMMERCIAL

## 2025-03-05 VITALS
BODY MASS INDEX: 28.32 KG/M2 | WEIGHT: 150 LBS | TEMPERATURE: 97.5 F | HEART RATE: 86 BPM | HEIGHT: 61 IN | SYSTOLIC BLOOD PRESSURE: 143 MMHG | DIASTOLIC BLOOD PRESSURE: 85 MMHG

## 2025-03-05 DIAGNOSIS — R19.7 DIARRHEA, UNSPECIFIED TYPE: Primary | ICD-10-CM

## 2025-03-05 PROCEDURE — 99214 OFFICE O/P EST MOD 30 MIN: CPT | Performed by: NURSE PRACTITIONER

## 2025-03-05 PROCEDURE — 99204 OFFICE O/P NEW MOD 45 MIN: CPT | Performed by: NURSE PRACTITIONER

## 2025-03-05 PROCEDURE — 3008F BODY MASS INDEX DOCD: CPT | Performed by: NURSE PRACTITIONER

## 2025-03-05 RX ORDER — SODIUM, POTASSIUM,MAG SULFATES 17.5-3.13G
SOLUTION, RECONSTITUTED, ORAL ORAL
Qty: 354 ML | Refills: 0 | Status: SHIPPED | OUTPATIENT
Start: 2025-03-05

## 2025-03-05 RX ORDER — ONDANSETRON 8 MG/1
8 TABLET, ORALLY DISINTEGRATING ORAL EVERY 8 HOURS PRN
Qty: 20 TABLET | Refills: 1 | Status: SHIPPED | OUTPATIENT
Start: 2025-03-05

## 2025-03-05 RX ORDER — DICYCLOMINE HYDROCHLORIDE 10 MG/1
10 CAPSULE ORAL
Qty: 120 CAPSULE | Refills: 11 | Status: SHIPPED | OUTPATIENT
Start: 2025-03-05 | End: 2026-03-05

## 2025-03-05 ASSESSMENT — ENCOUNTER SYMPTOMS
CONSTITUTIONAL NEGATIVE: 1
NEUROLOGICAL NEGATIVE: 1
ABDOMINAL PAIN: 1
MUSCULOSKELETAL NEGATIVE: 1
HEMATOLOGIC/LYMPHATIC NEGATIVE: 1
ALLERGIC/IMMUNOLOGIC NEGATIVE: 1
RESPIRATORY NEGATIVE: 1
EYES NEGATIVE: 1
DIARRHEA: 1
CARDIOVASCULAR NEGATIVE: 1
PSYCHIATRIC NEGATIVE: 1
ENDOCRINE NEGATIVE: 1

## 2025-03-05 NOTE — PROGRESS NOTES
Subjective   Patient ID: Maricruz Cantu is a 59 y.o. female who presents for New Patient Visit.  HPI  59-year-old female for new patient visit for evaluation of diarrhea and abdominal pain  FHX; brother -crohns, paternal grandmother- colon cancer age 54,paternal cousin- colon cancer  Social hx- quit smoking in 2018  Medical history includes breast cancer and ovarian cancer- total mastectomy and no chemo, Hashimoto's and thyroiditis, vitamin D, hypercholesterolemia and history of C. Difficile  No immunotherapy  Labs reviewed 6/6/2023  TSH 0.41  Normal LFTs  H&H 13.1 and 39.5  10/17/2018 negative hydrogen breath test  9/14/2018 colonoscopy was normal    [place]ret[place] daughter  Has gastro issues  Can move her bowel 12-14 times per day  Feels like she is done and needs to go back  Starts formed then gets loose as the day goes on   Horrible pain  Gets nausea  Can't correlate it with specific foods  Colonoscopy- last one was  Fiber- fruits and vegetables  Wakes her up from sleep at night  Water- drinks 3 cups per day  Tea, occasional diet soda  No diet candies    Review of Systems   Constitutional: Negative.    HENT: Negative.     Eyes: Negative.    Respiratory: Negative.     Cardiovascular: Negative.    Gastrointestinal:  Positive for abdominal pain and diarrhea.   Endocrine: Negative.    Genitourinary: Negative.    Musculoskeletal: Negative.    Skin: Negative.    Allergic/Immunologic: Negative.    Neurological: Negative.    Hematological: Negative.    Psychiatric/Behavioral: Negative.         Objective   Physical Exam  Constitutional:       Appearance: Normal appearance.   HENT:      Head: Normocephalic.      Nose: Nose normal.      Mouth/Throat:      Mouth: Mucous membranes are moist.   Eyes:      Pupils: Pupils are equal, round, and reactive to light.   Cardiovascular:      Rate and Rhythm: Normal rate and regular rhythm.      Pulses: Normal pulses.      Heart sounds: Normal heart sounds.   Pulmonary:      Effort:  Pulmonary effort is normal.      Breath sounds: Normal breath sounds.   Abdominal:      General: Bowel sounds are normal.      Palpations: Abdomen is soft.      Tenderness: There is abdominal tenderness.   Musculoskeletal:         General: Normal range of motion.      Cervical back: Normal range of motion and neck supple.   Skin:     General: Skin is warm and dry.   Neurological:      Mental Status: She is alert.   Psychiatric:         Mood and Affect: Mood normal.         Assessment/Plan        Chronic diarrhea and abdominal pain- you are having some formed so I do not suspect c-diff. You have been waking up at night with diarrhea which is an alarming feature. I will order a colonoscopy to assess for inflammatory bowel disease as well as stools for fecal calprotectin and fecal elastace for inflammation and pancreatic insufficency. I will also check labs for celiac, tsh and folate.     Nausea- you can use the zofran sparingly as this can cause constipation as well.     Please start on a fiber supplement daily benefiber one tablespoon in a glass of water daily to help you have a more complete Bm. You can also use bentyl ( dicylomine) up to 4 times daily.    I will contact you with your results and determine follow up    AMRIK Bowling-CNP 03/05/25 1:39 PM

## 2025-03-05 NOTE — PATIENT INSTRUCTIONS
Chronic diarrhea and abdominal pain- you are having some formed so I do not suspect c-diff. You have been waking up at night with diarrhea which is an alarming feature. I will order a colonoscopy to assess for inflammatory bowel disease as well as stools for fecal calprotectin and fecal elastace for inflammation and pancreatic insufficency. I will also check labs for celiac, tsh and folate.     Nausea- you can use the zofran sparingly as this can cause constipation as well.     Please start on a fiber supplement daily benefiber one tablespoon in a glass of water daily to help you have a more complete Bm. You can also use bentyl ( dicylomine) up to 4 times daily.    I will contact you with your results and determine follow up

## 2025-03-06 LAB
FOLATE SERPL-MCNC: 9 NG/ML
GLIADIN IGA SER IA-ACNC: 1.2 U/ML
GLIADIN IGG SER IA-ACNC: <1 U/ML
IGA SERPL-MCNC: 143 MG/DL (ref 47–310)
TSH SERPL-ACNC: 2.38 MIU/L (ref 0.4–4.5)
TTG IGA SER-ACNC: <1 U/ML
TTG IGG SER-ACNC: <1 U/ML

## 2025-03-11 ENCOUNTER — APPOINTMENT (OUTPATIENT)
Dept: PRIMARY CARE | Facility: CLINIC | Age: 60
End: 2025-03-11
Payer: COMMERCIAL

## 2025-03-12 ENCOUNTER — APPOINTMENT (OUTPATIENT)
Dept: PRIMARY CARE | Facility: CLINIC | Age: 60
End: 2025-03-12
Payer: COMMERCIAL

## 2025-03-12 VITALS
DIASTOLIC BLOOD PRESSURE: 71 MMHG | HEART RATE: 92 BPM | HEIGHT: 61 IN | WEIGHT: 147.8 LBS | OXYGEN SATURATION: 96 % | BODY MASS INDEX: 27.9 KG/M2 | SYSTOLIC BLOOD PRESSURE: 108 MMHG | TEMPERATURE: 96.3 F

## 2025-03-12 DIAGNOSIS — R10.30 LOWER ABDOMINAL PAIN: ICD-10-CM

## 2025-03-12 DIAGNOSIS — R50.9 FEVER AND CHILLS: ICD-10-CM

## 2025-03-12 DIAGNOSIS — N39.0 URINARY TRACT INFECTION WITHOUT HEMATURIA, SITE UNSPECIFIED: Primary | ICD-10-CM

## 2025-03-12 LAB
APPEARANCE UR: ABNORMAL
BILIRUB UR QL STRIP: NEGATIVE
COLOR UR: ABNORMAL
GLUCOSE UR STRIP-MCNC: NEGATIVE MG/DL
HGB UR QL STRIP: NEGATIVE
KETONES UR STRIP-MCNC: NEGATIVE MG/DL
LEUKOCYTE ESTERASE UR QL STRIP: ABNORMAL
NITRITE UR QL STRIP: NEGATIVE
PH UR STRIP: 5.5 [PH]
PROT UR STRIP-MCNC: ABNORMAL MG/DL
SP GR UR STRIP.AUTO: 1.02
UROBILINOGEN UR STRIP-ACNC: 0.2 E.U./DL

## 2025-03-12 PROCEDURE — 81003 URINALYSIS AUTO W/O SCOPE: CPT | Performed by: INTERNAL MEDICINE

## 2025-03-12 PROCEDURE — 99215 OFFICE O/P EST HI 40 MIN: CPT | Performed by: INTERNAL MEDICINE

## 2025-03-12 PROCEDURE — 3008F BODY MASS INDEX DOCD: CPT | Performed by: INTERNAL MEDICINE

## 2025-03-12 PROCEDURE — 1036F TOBACCO NON-USER: CPT | Performed by: INTERNAL MEDICINE

## 2025-03-12 NOTE — LETTER
March 12, 2025     Patient: Maricruz Cantu   YOB: 1965   Date of Visit: 3/12/2025       To Whom It May Concern:    Maricruz Cantu was seen in my clinic on 3/12/2025 at 9:00 am. Please excuse Maricruz for her absence from work this week as she recovers from her illness.    If you have any questions or concerns, please don't hesitate to call.         Sincerely,         Shine Rollins MD        CC: No Recipients

## 2025-03-12 NOTE — PROGRESS NOTES
"Subjective   Patient ID: Maricruz Cantu is a 59 y.o. female who presents for Sick Visit (PT is having UTI symptoms).    First and only visit was 6/6/2023, unfortunately it appears there were 6 or 7 cancellations or no-show appointments since then.    Past medical history includes Hashimoto's hypothyroidism, factor XI deficiency left breast and ovarian cancer chronic GI issues and hyperlipidemia.  See initial visit for details.  She is not taking the statin.    Current symptoms started Friday, initially just not feeling well, mild but then around 2 AM developed chills fever sweats and increased fatigue.  Also decreased appetite and nausea, no changes to chronic bowel issues.    She was evaluated today in the [facility] ED 3/10/2025 including EKG (not available but read as sinus rhythm without acute changes), labs which were reviewed including, CMP, CBC, troponin and urinalysis as well as viral PCR which were all essentially unremarkable.  Discharged home without any further treatment or recommendations.    Symptoms persist for the most part, the only changes increased urinary urgency with mostly small amounts of urine and some suprapubic pressure and some low back pain.  No significant pain, no hematuria, no dysuria.    Objective   Physical Exam    /71 (BP Location: Left arm, Patient Position: Sitting, BP Cuff Size: Adult)   Pulse 92   Temp 35.7 °C (96.3 °F) (Temporal)   Ht 1.549 m (5' 1\")   Wt 67 kg (147 lb 12.8 oz)   SpO2 96%   BMI 27.93 kg/m²      Gen: NAD, pleasant, A&Ox3  HEENT: PERRL, EOMI, MMM, OP clear  Neck: supple, no thyromegaly, no JVD, normal carotid upstroke  Pulm: lungs CTAB, good air movement  CV: RRR, no m/r/g, 2+ DP pulses  Abd: NABS, soft, NT, ND no HSM  Ext: no peripheral edema  Neuro: CN II-XII intact, no focal sensory or motor deficits, normal reflexes    Assessment/Plan     Acute illness, likely viral.  I did see and history she has a history of recurrent CMV which, if accurate, " would be consistent with mostly mononucleosis like syndrome.  However, recent workup was completely unremarkable and there is not much I would add to the testing.  If she does not improve or gets worse, I will repeat the testing adding LFTs and inflammatory markers.  UA was ordered but she cannot provide a sample today as some of her urinary symptoms have changed.    Shine Rollins MD

## 2025-03-13 LAB
ALBUMIN SERPL-MCNC: 4.4 G/DL (ref 3.6–5.1)
ALP SERPL-CCNC: 59 U/L (ref 37–153)
ALT SERPL-CCNC: 14 U/L (ref 6–29)
ANION GAP SERPL CALCULATED.4IONS-SCNC: 9 MMOL/L (CALC) (ref 7–17)
APPEARANCE UR: CLEAR
AST SERPL-CCNC: 11 U/L (ref 10–35)
BACTERIA #/AREA URNS HPF: ABNORMAL /HPF
BACTERIA UR CULT: ABNORMAL
BASOPHILS # BLD AUTO: 80 CELLS/UL (ref 0–200)
BASOPHILS NFR BLD AUTO: 1 %
BILIRUB SERPL-MCNC: 0.3 MG/DL (ref 0.2–1.2)
BILIRUB UR QL STRIP: NEGATIVE
BUN SERPL-MCNC: 14 MG/DL (ref 7–25)
CALCIUM SERPL-MCNC: 9.6 MG/DL (ref 8.6–10.4)
CHLORIDE SERPL-SCNC: 105 MMOL/L (ref 98–110)
CO2 SERPL-SCNC: 21 MMOL/L (ref 20–32)
COLOR UR: YELLOW
CREAT SERPL-MCNC: 1.04 MG/DL (ref 0.5–1.03)
CRP SERPL-MCNC: 6.5 MG/L
EGFRCR SERPLBLD CKD-EPI 2021: 63 ML/MIN/1.73M2
EOSINOPHIL # BLD AUTO: 240 CELLS/UL (ref 15–500)
EOSINOPHIL NFR BLD AUTO: 3 %
ERYTHROCYTE [DISTWIDTH] IN BLOOD BY AUTOMATED COUNT: 12.4 % (ref 11–15)
ERYTHROCYTE [SEDIMENTATION RATE] IN BLOOD BY WESTERGREN METHOD: 29 MM/H
GLUCOSE SERPL-MCNC: 103 MG/DL (ref 65–99)
GLUCOSE UR QL STRIP: NEGATIVE
HCT VFR BLD AUTO: 39.1 % (ref 35–45)
HGB BLD-MCNC: 13.2 G/DL (ref 11.7–15.5)
HGB UR QL STRIP: ABNORMAL
HYALINE CASTS #/AREA URNS LPF: ABNORMAL /LPF
KETONES UR QL STRIP: NEGATIVE
LEUKOCYTE ESTERASE UR QL STRIP: ABNORMAL
LYMPHOCYTES # BLD AUTO: 2744 CELLS/UL (ref 850–3900)
LYMPHOCYTES NFR BLD AUTO: 34.3 %
MCH RBC QN AUTO: 33.2 PG (ref 27–33)
MCHC RBC AUTO-ENTMCNC: 33.8 G/DL (ref 32–36)
MCV RBC AUTO: 98.5 FL (ref 80–100)
MONOCYTES # BLD AUTO: 512 CELLS/UL (ref 200–950)
MONOCYTES NFR BLD AUTO: 6.4 %
NEUTROPHILS # BLD AUTO: 4424 CELLS/UL (ref 1500–7800)
NEUTROPHILS NFR BLD AUTO: 55.3 %
NITRITE UR QL STRIP: NEGATIVE
PH UR STRIP: 5.5 [PH] (ref 5–8)
PLATELET # BLD AUTO: 312 THOUSAND/UL (ref 140–400)
PMV BLD REES-ECKER: 10.1 FL (ref 7.5–12.5)
POTASSIUM SERPL-SCNC: 4.1 MMOL/L (ref 3.5–5.3)
PROT SERPL-MCNC: 7.7 G/DL (ref 6.1–8.1)
PROT UR QL STRIP: ABNORMAL
RBC # BLD AUTO: 3.97 MILLION/UL (ref 3.8–5.1)
RBC #/AREA URNS HPF: ABNORMAL /HPF
SERVICE CMNT-IMP: ABNORMAL
SODIUM SERPL-SCNC: 135 MMOL/L (ref 135–146)
SP GR UR STRIP: 1.02 (ref 1–1.03)
SQUAMOUS #/AREA URNS HPF: ABNORMAL /HPF
WBC # BLD AUTO: 8 THOUSAND/UL (ref 3.8–10.8)
WBC #/AREA URNS HPF: ABNORMAL /HPF

## 2025-03-17 ENCOUNTER — APPOINTMENT (OUTPATIENT)
Dept: GASTROENTEROLOGY | Facility: EXTERNAL LOCATION | Age: 60
End: 2025-03-17
Payer: COMMERCIAL

## 2025-03-28 ENCOUNTER — APPOINTMENT (OUTPATIENT)
Dept: GASTROENTEROLOGY | Facility: EXTERNAL LOCATION | Age: 60
End: 2025-03-28
Payer: COMMERCIAL

## 2025-03-28 DIAGNOSIS — K52.9 CHRONIC DIARRHEA: ICD-10-CM

## 2025-03-28 DIAGNOSIS — D12.2 BENIGN NEOPLASM OF ASCENDING COLON: ICD-10-CM

## 2025-03-28 DIAGNOSIS — Z12.11 SPECIAL SCREENING FOR MALIGNANT NEOPLASMS, COLON: ICD-10-CM

## 2025-03-28 DIAGNOSIS — R19.7 DIARRHEA, UNSPECIFIED TYPE: ICD-10-CM

## 2025-03-28 DIAGNOSIS — R19.7 DIARRHEA, UNSPECIFIED TYPE: Primary | ICD-10-CM

## 2025-03-28 PROCEDURE — 88305 TISSUE EXAM BY PATHOLOGIST: CPT | Performed by: PATHOLOGY

## 2025-03-28 PROCEDURE — 45380 COLONOSCOPY AND BIOPSY: CPT | Performed by: INTERNAL MEDICINE

## 2025-03-31 ENCOUNTER — LAB REQUISITION (OUTPATIENT)
Dept: LAB | Facility: HOSPITAL | Age: 60
End: 2025-03-31
Payer: COMMERCIAL

## 2025-04-09 ENCOUNTER — E-VISIT (OUTPATIENT)
Dept: PRIMARY CARE | Facility: CLINIC | Age: 60
End: 2025-04-09
Payer: COMMERCIAL

## 2025-04-09 DIAGNOSIS — J01.00 ACUTE NON-RECURRENT MAXILLARY SINUSITIS: Primary | ICD-10-CM

## 2025-04-09 LAB
LABORATORY COMMENT REPORT: NORMAL
PATH REPORT.FINAL DX SPEC: NORMAL
PATH REPORT.GROSS SPEC: NORMAL
PATH REPORT.RELEVANT HX SPEC: NORMAL
PATH REPORT.TOTAL CANCER: NORMAL

## 2025-04-09 RX ORDER — AMOXICILLIN AND CLAVULANATE POTASSIUM 875; 125 MG/1; MG/1
875 TABLET, FILM COATED ORAL 2 TIMES DAILY
Qty: 20 TABLET | Refills: 0 | Status: SHIPPED | OUTPATIENT
Start: 2025-04-09 | End: 2025-04-19

## 2025-04-09 ASSESSMENT — LIFESTYLE VARIABLES: HISTORY_OF_SMOKING: I SMOKED IN THE PAST, BUT QUIT

## 2025-04-09 NOTE — TELEPHONE ENCOUNTER
On Demand Virtual Visit Patient Consent     This visit was completed via electronic form. All issues as below were discussed and addressed but no physical exam was performed. If it was felt that the patient should be evaluated in clinic than they were directed there.     Sinus Pain: Patient complains of congestion, facial pain, purulent nasal discharge, and sinus pressure. Symptoms do not include low grade fever, non productive cough, rash, shortness of breath, sneezing, and sore throat with no fever, chills, night sweats or weight loss. Onset of symptoms was 2 weeks ago, unchanged since that time. She is drinking plenty of fluids.  Past history is significant for no history of pneumonia or bronchitis. Patient is non-smoker    Maricruz was seen today for sinus problem.  Diagnoses and all orders for this visit:  Acute non-recurrent maxillary sinusitis  -     amoxicillin-pot clavulanate (Augmentin) 875-125 mg tablet; Take 1 tablet (875 mg) by mouth 2 times a day for 10 days.

## (undated) DEVICE — SUTURE, CHROMIC GUT 5/0  18  P-13"

## (undated) DEVICE — SUTURE, VICRYL, 4-0, 18 IN, P-3, UNDYED

## (undated) DEVICE — SUTURE, MONOCRYLIC, 4-0, P3, MONO 18

## (undated) DEVICE — PREP TRAY, SKIN, DRY, W/12 SPONGES, W/GLOVES

## (undated) DEVICE — BANDAGE, ESMARK 4 IN X 9 FT, STERILE

## (undated) DEVICE — PACK, BASIC

## (undated) DEVICE — PADDING, WEBRIL, UNDERCAST, STERILE, 4 IN

## (undated) DEVICE — DRAPE, SHEET, HAND, GUSSETTED, W/TABLE EXTENSION, 77 X 146 IN, DISPOSABLE, LF, STERILE

## (undated) DEVICE — GLOVE, SURGICAL, PROTEXIS PI , 7.5, PF, LF

## (undated) DEVICE — CUFF, TOURNIQUET, 18 X 4, SNGL PORT/SNGL BLADDER, DISP, LF

## (undated) DEVICE — STOCKINETTE, DOUBLE PLY, 4 X 48 IN, STERILE

## (undated) DEVICE — GLOVE, SURGICAL, PROTEXIS PI , 7.0, PF, LF

## (undated) DEVICE — DRESSING, GAUZE, SPONGE, 12 PLY, CURITY, 4 X 4 IN, STERILE

## (undated) DEVICE — DRESSING, GAUZE, PETROLATUM, STRIP, XEROFORM, 1 X 8 IN, STERILE

## (undated) DEVICE — CORD, CAUTERY, BIOPOLAR FORCEP, 12FT

## (undated) DEVICE — BANDAGE, ELASTIC, ACE, SELF-CLOSURE, 3 IN X 5 YD, NONSTERILE